# Patient Record
Sex: FEMALE | Race: OTHER | URBAN - METROPOLITAN AREA
[De-identification: names, ages, dates, MRNs, and addresses within clinical notes are randomized per-mention and may not be internally consistent; named-entity substitution may affect disease eponyms.]

---

## 2023-06-26 ENCOUNTER — INITIAL PRENATAL (OUTPATIENT)
Dept: OBGYN CLINIC | Age: 23
End: 2023-06-26

## 2023-06-26 VITALS — SYSTOLIC BLOOD PRESSURE: 122 MMHG | DIASTOLIC BLOOD PRESSURE: 56 MMHG | WEIGHT: 196 LBS

## 2023-06-26 DIAGNOSIS — Z23 NEED FOR DIPHTHERIA-TETANUS-PERTUSSIS (TDAP) VACCINE: ICD-10-CM

## 2023-06-26 DIAGNOSIS — Z34.93 PRENATAL CARE IN THIRD TRIMESTER: ICD-10-CM

## 2023-06-26 DIAGNOSIS — Z3A.32 32 WEEKS GESTATION OF PREGNANCY: ICD-10-CM

## 2023-06-26 DIAGNOSIS — Z34.93 PRENATAL CARE IN THIRD TRIMESTER: Primary | ICD-10-CM

## 2023-06-26 LAB
GLUCOSE, 1HR PP: 122 MG/DL (ref 60–140)
HCT VFR BLD AUTO: 31 % (ref 37–47)
HGB BLD-MCNC: 10 G/DL (ref 12–16)

## 2023-06-26 PROCEDURE — 99203 OFFICE O/P NEW LOW 30 MIN: CPT | Performed by: OBSTETRICS & GYNECOLOGY

## 2023-06-26 PROCEDURE — 90715 TDAP VACCINE 7 YRS/> IM: CPT | Performed by: OBSTETRICS & GYNECOLOGY

## 2023-06-26 PROCEDURE — 90471 IMMUNIZATION ADMIN: CPT | Performed by: OBSTETRICS & GYNECOLOGY

## 2023-06-27 LAB — RPR SER QL: NORMAL

## 2023-07-11 ENCOUNTER — ROUTINE PRENATAL (OUTPATIENT)
Dept: OBGYN CLINIC | Age: 23
End: 2023-07-11

## 2023-07-11 VITALS — WEIGHT: 201 LBS | DIASTOLIC BLOOD PRESSURE: 56 MMHG | SYSTOLIC BLOOD PRESSURE: 114 MMHG

## 2023-07-11 DIAGNOSIS — Z3A.33 33 WEEKS GESTATION OF PREGNANCY: ICD-10-CM

## 2023-07-11 DIAGNOSIS — Z34.93 PRENATAL CARE IN THIRD TRIMESTER: Primary | ICD-10-CM

## 2023-07-11 PROCEDURE — 99213 OFFICE O/P EST LOW 20 MIN: CPT | Performed by: OBSTETRICS & GYNECOLOGY

## 2023-07-11 NOTE — PROGRESS NOTES
Patient's last menstrual period was 11/20/2022.   Please reference prenatal and OB flow chart for further information  PT here today for routine prenatal care  Pt endorses fetal movement and denies loss of fluid, contractions or vaginal bleeding  Pt without complaints  ROS:  Pt denies headache, dysuria, nausea/vomiting  PE:  BP (!) 114/56   Wt 201 lb (91.2 kg)   LMP 11/20/2022   Gen - Alert and oriented x 3  HEENT- NC/AT, CVS - RRR, Lungs - CTAB  Abd - FH 34  Appropriate fetal growth  1hr 122, hgb 10  US pending Referred To Mid-Level For Closure Text (Leave Blank If You Do Not Want): After obtaining clear surgical margins the patient was sent to a mid-level provider for surgical repair.  The patient understands they will receive post-surgical care and follow-up from the mid-level provider.

## 2023-07-20 ENCOUNTER — HOSPITAL ENCOUNTER (OUTPATIENT)
Dept: ULTRASOUND IMAGING | Age: 23
Discharge: HOME | End: 2023-07-22
Attending: OBSTETRICS & GYNECOLOGY

## 2023-07-20 DIAGNOSIS — Z34.93 PRENATAL CARE IN THIRD TRIMESTER: ICD-10-CM

## 2023-07-20 DIAGNOSIS — Z3A.32 32 WEEKS GESTATION OF PREGNANCY: ICD-10-CM

## 2023-07-20 PROCEDURE — 76815 OB US LIMITED FETUS(S): CPT

## 2023-07-25 ENCOUNTER — ROUTINE PRENATAL (OUTPATIENT)
Dept: OBGYN CLINIC | Age: 23
End: 2023-07-25

## 2023-07-25 VITALS — SYSTOLIC BLOOD PRESSURE: 118 MMHG | DIASTOLIC BLOOD PRESSURE: 64 MMHG | WEIGHT: 203 LBS

## 2023-07-25 DIAGNOSIS — O09.33 INSUFFICIENT PRENATAL CARE IN THIRD TRIMESTER: ICD-10-CM

## 2023-07-25 DIAGNOSIS — Z3A.35 35 WEEKS GESTATION OF PREGNANCY: ICD-10-CM

## 2023-07-25 DIAGNOSIS — Z34.93 PRENATAL CARE IN THIRD TRIMESTER: Primary | ICD-10-CM

## 2023-07-25 PROCEDURE — 99214 OFFICE O/P EST MOD 30 MIN: CPT | Performed by: OBSTETRICS & GYNECOLOGY

## 2023-07-25 RX ORDER — ONDANSETRON 4 MG/1
4 TABLET, FILM COATED ORAL EVERY 8 HOURS PRN
Qty: 30 TABLET | Refills: 3 | Status: SHIPPED | OUTPATIENT
Start: 2023-07-25

## 2023-07-25 NOTE — PROGRESS NOTES
Patient's last menstrual period was 11/20/2022.   Please reference prenatal and OB flow chart for further information  PT here today for routine prenatal care  Pt endorses fetal movement and denies loss of fluid, contractions or vaginal bleeding  Pt without complaints  ROS:  Pt denies headache, dysuria, nausea/vomiting  PE:  /64   Wt 203 lb (92.1 kg)   LMP 11/20/2022   Gen - Alert and oriented x 3  HEENT- NC/AT, CVS - RRR, Lungs - CTAB  Abd - FH 36  Appropriate fetal growth  US reviewed - ceph, FROYLAN 13, EFW 30%  GBS at next apt

## 2023-08-08 ENCOUNTER — ROUTINE PRENATAL (OUTPATIENT)
Dept: OBGYN CLINIC | Age: 23
End: 2023-08-08

## 2023-08-08 VITALS — SYSTOLIC BLOOD PRESSURE: 104 MMHG | DIASTOLIC BLOOD PRESSURE: 58 MMHG | WEIGHT: 209 LBS

## 2023-08-08 DIAGNOSIS — Z3A.37 37 WEEKS GESTATION OF PREGNANCY: ICD-10-CM

## 2023-08-08 DIAGNOSIS — Z34.93 PRENATAL CARE IN THIRD TRIMESTER: ICD-10-CM

## 2023-08-08 DIAGNOSIS — Z34.93 PRENATAL CARE IN THIRD TRIMESTER: Primary | ICD-10-CM

## 2023-08-08 PROCEDURE — 99212 OFFICE O/P EST SF 10 MIN: CPT | Performed by: OBSTETRICS & GYNECOLOGY

## 2023-08-08 NOTE — PROGRESS NOTES
Patient's last menstrual period was 11/20/2022.   Please reference prenatal and OB flow chart for further information  PT here today for routine prenatal care  Pt endorses fetal movement and denies loss of fluid, contractions or vaginal bleeding  Pt without complaints  ROS:  Pt denies headache, dysuria, nausea/vomiting  PE:  BP (!) 104/58   Wt 209 lb (94.8 kg)   LMP 11/20/2022   Gen - Alert and oriented x 3  HEENT- NC/AT, CVS - RRR, Lungs - CTAB  Abd - FH 38  Appropriate fetal growth  GBS pending

## 2023-08-14 ENCOUNTER — ROUTINE PRENATAL (OUTPATIENT)
Dept: OBGYN CLINIC | Age: 23
End: 2023-08-14

## 2023-08-14 VITALS — SYSTOLIC BLOOD PRESSURE: 116 MMHG | DIASTOLIC BLOOD PRESSURE: 70 MMHG | WEIGHT: 208 LBS

## 2023-08-14 DIAGNOSIS — Z3A.38 38 WEEKS GESTATION OF PREGNANCY: ICD-10-CM

## 2023-08-14 DIAGNOSIS — Z34.93 PRENATAL CARE IN THIRD TRIMESTER: Primary | ICD-10-CM

## 2023-08-14 LAB
GP B STREP SPEC QL CULT: ABNORMAL
GP B STREP SPEC QL CULT: ABNORMAL
ORGANISM: ABNORMAL

## 2023-08-14 PROCEDURE — 99212 OFFICE O/P EST SF 10 MIN: CPT | Performed by: OBSTETRICS & GYNECOLOGY

## 2023-08-14 NOTE — PROGRESS NOTES
Patient's last menstrual period was 11/20/2022.   Please reference prenatal and OB flow chart for further information  PT here today for routine prenatal care  Pt endorses fetal movement and denies loss of fluid, contractions or vaginal bleeding  Pt without complaints  ROS:  Pt denies headache, dysuria, nausea/vomiting  PE:  /70   Wt 208 lb (94.3 kg)   LMP 11/20/2022   Gen - Alert and oriented x 3  HEENT- NC/AT, CVS - RRR, Lungs - CTAB  Abd - FH 38  Appropriate fetal growth  GBS positive  Cx: 1/50/-2

## 2023-08-26 ENCOUNTER — ANESTHESIA (OUTPATIENT)
Dept: LABOR AND DELIVERY | Age: 23
End: 2023-08-26

## 2023-08-26 ENCOUNTER — ANESTHESIA EVENT (OUTPATIENT)
Dept: LABOR AND DELIVERY | Age: 23
End: 2023-08-26

## 2023-08-26 ENCOUNTER — HOSPITAL ENCOUNTER (INPATIENT)
Age: 23
LOS: 2 days | Discharge: HOME | End: 2023-08-28
Attending: OBSTETRICS & GYNECOLOGY | Admitting: OBSTETRICS & GYNECOLOGY
Payer: MEDICAID

## 2023-08-26 LAB
ABO + RH BLD: NORMAL
ALBUMIN SERPL-MCNC: 3.3 G/DL (ref 3.5–4.6)
ALP SERPL-CCNC: 254 U/L (ref 40–130)
ALT SERPL-CCNC: 9 U/L (ref 0–33)
AMPHET UR QL SCN: NORMAL
ANION GAP SERPL CALCULATED.3IONS-SCNC: 13 MEQ/L (ref 9–15)
AST SERPL-CCNC: 16 U/L (ref 0–35)
BACTERIA URNS QL MICRO: ABNORMAL /HPF
BARBITURATES UR QL SCN: NORMAL
BASOPHILS # BLD: 0.1 K/UL (ref 0–0.2)
BASOPHILS NFR BLD: 0.7 %
BENZODIAZ UR QL SCN: NORMAL
BILIRUB SERPL-MCNC: <0.2 MG/DL (ref 0.2–0.7)
BILIRUB UR QL STRIP: NEGATIVE
BLD GP AB SCN SERPL QL: NORMAL
BUN SERPL-MCNC: 7 MG/DL (ref 6–20)
CALCIUM SERPL-MCNC: 8.2 MG/DL (ref 8.5–9.9)
CANNABINOIDS UR QL SCN: NORMAL
CHLORIDE SERPL-SCNC: 106 MEQ/L (ref 95–107)
CLARITY UR: ABNORMAL
CO2 SERPL-SCNC: 18 MEQ/L (ref 20–31)
COCAINE UR QL SCN: NORMAL
COLOR UR: YELLOW
CREAT SERPL-MCNC: 0.42 MG/DL (ref 0.5–0.9)
DRUG SCREEN COMMENT UR-IMP: NORMAL
EOSINOPHIL # BLD: 0.1 K/UL (ref 0–0.7)
EOSINOPHIL NFR BLD: 0.5 %
EPI CELLS #/AREA URNS AUTO: ABNORMAL /HPF (ref 0–5)
ERYTHROCYTE [DISTWIDTH] IN BLOOD BY AUTOMATED COUNT: 16.3 % (ref 11.5–14.5)
FENTANYL SCREEN, URINE: NORMAL
GLOBULIN SER CALC-MCNC: 3 G/DL (ref 2.3–3.5)
GLUCOSE SERPL-MCNC: 128 MG/DL (ref 70–99)
GLUCOSE UR STRIP-MCNC: NEGATIVE MG/DL
HBV SURFACE AG SERPL QL IA: NORMAL
HCT VFR BLD AUTO: 29.4 % (ref 37–47)
HGB BLD-MCNC: 9.5 G/DL (ref 12–16)
HGB UR QL STRIP: ABNORMAL
HYALINE CASTS #/AREA URNS AUTO: ABNORMAL /HPF (ref 0–5)
KETONES UR STRIP-MCNC: >=80 MG/DL
LEUKOCYTE ESTERASE UR QL STRIP: ABNORMAL
LYMPHOCYTES # BLD: 2.1 K/UL (ref 1–4.8)
LYMPHOCYTES NFR BLD: 13.2 %
MCH RBC QN AUTO: 24.5 PG (ref 27–31.3)
MCHC RBC AUTO-ENTMCNC: 32.3 % (ref 33–37)
MCV RBC AUTO: 75.7 FL (ref 79.4–94.8)
METHADONE UR QL SCN: NORMAL
MONOCYTES # BLD: 1 K/UL (ref 0.2–0.8)
MONOCYTES NFR BLD: 6.3 %
NEUTROPHILS # BLD: 12.4 K/UL (ref 1.4–6.5)
NEUTS SEG NFR BLD: 79.3 %
NITRITE UR QL STRIP: NEGATIVE
OPIATES UR QL SCN: NORMAL
OXYCODONE UR QL SCN: NORMAL
PCP UR QL SCN: NORMAL
PH UR STRIP: 5.5 [PH] (ref 5–9)
PLATELET # BLD AUTO: 320 K/UL (ref 130–400)
POTASSIUM SERPL-SCNC: 3.3 MEQ/L (ref 3.4–4.9)
PROPOXYPH UR QL SCN: NORMAL
PROT SERPL-MCNC: 6.3 G/DL (ref 6.3–8)
PROT UR STRIP-MCNC: ABNORMAL MG/DL
RBC # BLD AUTO: 3.89 M/UL (ref 4.2–5.4)
RBC #/AREA URNS AUTO: ABNORMAL /HPF (ref 0–5)
SODIUM SERPL-SCNC: 137 MEQ/L (ref 135–144)
SP GR UR STRIP: 1.03 (ref 1–1.03)
UROBILINOGEN UR STRIP-ACNC: 0.2 E.U./DL
WBC # BLD AUTO: 15.7 K/UL (ref 4.8–10.8)
WBC #/AREA URNS AUTO: >100 /HPF (ref 0–5)

## 2023-08-26 PROCEDURE — 86901 BLOOD TYPING SEROLOGIC RH(D): CPT

## 2023-08-26 PROCEDURE — 2580000003 HC RX 258: Performed by: OBSTETRICS & GYNECOLOGY

## 2023-08-26 PROCEDURE — 6360000002 HC RX W HCPCS: Performed by: OBSTETRICS & GYNECOLOGY

## 2023-08-26 PROCEDURE — 6370000000 HC RX 637 (ALT 250 FOR IP): Performed by: OBSTETRICS & GYNECOLOGY

## 2023-08-26 PROCEDURE — 80053 COMPREHEN METABOLIC PANEL: CPT

## 2023-08-26 PROCEDURE — 85025 COMPLETE CBC W/AUTO DIFF WBC: CPT

## 2023-08-26 PROCEDURE — 80307 DRUG TEST PRSMV CHEM ANLYZR: CPT

## 2023-08-26 PROCEDURE — 86592 SYPHILIS TEST NON-TREP QUAL: CPT

## 2023-08-26 PROCEDURE — 2500000003 HC RX 250 WO HCPCS: Performed by: OBSTETRICS & GYNECOLOGY

## 2023-08-26 PROCEDURE — 3700000025 EPIDURAL BLOCK: Performed by: NURSE ANESTHETIST, CERTIFIED REGISTERED

## 2023-08-26 PROCEDURE — 81001 URINALYSIS AUTO W/SCOPE: CPT

## 2023-08-26 PROCEDURE — 86850 RBC ANTIBODY SCREEN: CPT

## 2023-08-26 PROCEDURE — 86900 BLOOD TYPING SEROLOGIC ABO: CPT

## 2023-08-26 PROCEDURE — 2500000003 HC RX 250 WO HCPCS: Performed by: NURSE ANESTHETIST, CERTIFIED REGISTERED

## 2023-08-26 PROCEDURE — 87340 HEPATITIS B SURFACE AG IA: CPT

## 2023-08-26 PROCEDURE — 87389 HIV-1 AG W/HIV-1&-2 AB AG IA: CPT

## 2023-08-26 PROCEDURE — 1220000000 HC SEMI PRIVATE OB R&B

## 2023-08-26 RX ORDER — MISOPROSTOL 200 UG/1
400 TABLET ORAL PRN
Status: DISCONTINUED | OUTPATIENT
Start: 2023-08-26 | End: 2023-08-27

## 2023-08-26 RX ORDER — SODIUM CHLORIDE 0.9 % (FLUSH) 0.9 %
5-40 SYRINGE (ML) INJECTION PRN
Status: DISCONTINUED | OUTPATIENT
Start: 2023-08-26 | End: 2023-08-27

## 2023-08-26 RX ORDER — SODIUM CHLORIDE 9 MG/ML
INJECTION, SOLUTION INTRAVENOUS PRN
Status: DISCONTINUED | OUTPATIENT
Start: 2023-08-26 | End: 2023-08-27

## 2023-08-26 RX ORDER — ACETAMINOPHEN 325 MG/1
650 TABLET ORAL EVERY 4 HOURS PRN
Status: DISCONTINUED | OUTPATIENT
Start: 2023-08-26 | End: 2023-08-27

## 2023-08-26 RX ORDER — SODIUM CHLORIDE, SODIUM LACTATE, POTASSIUM CHLORIDE, AND CALCIUM CHLORIDE .6; .31; .03; .02 G/100ML; G/100ML; G/100ML; G/100ML
500 INJECTION, SOLUTION INTRAVENOUS PRN
Status: DISCONTINUED | OUTPATIENT
Start: 2023-08-26 | End: 2023-08-27

## 2023-08-26 RX ORDER — METHYLERGONOVINE MALEATE 0.2 MG/ML
200 INJECTION INTRAVENOUS PRN
Status: DISCONTINUED | OUTPATIENT
Start: 2023-08-26 | End: 2023-08-27

## 2023-08-26 RX ORDER — NALOXONE HYDROCHLORIDE 0.4 MG/ML
INJECTION, SOLUTION INTRAMUSCULAR; INTRAVENOUS; SUBCUTANEOUS PRN
Status: DISCONTINUED | OUTPATIENT
Start: 2023-08-26 | End: 2023-08-27

## 2023-08-26 RX ORDER — SODIUM CHLORIDE 0.9 % (FLUSH) 0.9 %
5-40 SYRINGE (ML) INJECTION EVERY 12 HOURS SCHEDULED
Status: DISCONTINUED | OUTPATIENT
Start: 2023-08-26 | End: 2023-08-27

## 2023-08-26 RX ORDER — CARBOPROST TROMETHAMINE 250 UG/ML
250 INJECTION, SOLUTION INTRAMUSCULAR PRN
Status: DISCONTINUED | OUTPATIENT
Start: 2023-08-26 | End: 2023-08-27

## 2023-08-26 RX ORDER — NALBUPHINE HYDROCHLORIDE 20 MG/ML
5 INJECTION, SOLUTION INTRAMUSCULAR; INTRAVENOUS; SUBCUTANEOUS
Status: DISCONTINUED | OUTPATIENT
Start: 2023-08-26 | End: 2023-08-27

## 2023-08-26 RX ORDER — DIPHENHYDRAMINE HCL 25 MG
25 TABLET ORAL EVERY 4 HOURS PRN
Status: DISCONTINUED | OUTPATIENT
Start: 2023-08-26 | End: 2023-08-27

## 2023-08-26 RX ORDER — ONDANSETRON 2 MG/ML
4 INJECTION INTRAMUSCULAR; INTRAVENOUS EVERY 6 HOURS PRN
Status: DISCONTINUED | OUTPATIENT
Start: 2023-08-26 | End: 2023-08-27

## 2023-08-26 RX ORDER — DOCUSATE SODIUM 100 MG/1
100 CAPSULE, LIQUID FILLED ORAL 2 TIMES DAILY PRN
Status: DISCONTINUED | OUTPATIENT
Start: 2023-08-26 | End: 2023-08-27

## 2023-08-26 RX ORDER — SODIUM CHLORIDE, SODIUM LACTATE, POTASSIUM CHLORIDE, CALCIUM CHLORIDE 600; 310; 30; 20 MG/100ML; MG/100ML; MG/100ML; MG/100ML
INJECTION, SOLUTION INTRAVENOUS CONTINUOUS
Status: DISCONTINUED | OUTPATIENT
Start: 2023-08-26 | End: 2023-08-27

## 2023-08-26 RX ORDER — SODIUM CHLORIDE, SODIUM LACTATE, POTASSIUM CHLORIDE, AND CALCIUM CHLORIDE .6; .31; .03; .02 G/100ML; G/100ML; G/100ML; G/100ML
1000 INJECTION, SOLUTION INTRAVENOUS PRN
Status: DISCONTINUED | OUTPATIENT
Start: 2023-08-26 | End: 2023-08-27

## 2023-08-26 RX ADMIN — SODIUM CHLORIDE, POTASSIUM CHLORIDE, SODIUM LACTATE AND CALCIUM CHLORIDE 1000 ML: 600; 310; 30; 20 INJECTION, SOLUTION INTRAVENOUS at 18:26

## 2023-08-26 RX ADMIN — Medication 5 ML: at 19:17

## 2023-08-26 RX ADMIN — SODIUM CHLORIDE, POTASSIUM CHLORIDE, SODIUM LACTATE AND CALCIUM CHLORIDE: 600; 310; 30; 20 INJECTION, SOLUTION INTRAVENOUS at 17:26

## 2023-08-26 RX ADMIN — Medication 5 ML: at 19:07

## 2023-08-26 RX ADMIN — Medication 10 ML/HR: at 19:18

## 2023-08-26 RX ADMIN — DIPHENHYDRAMINE HCL 25 MG: 25 TABLET ORAL at 20:33

## 2023-08-26 RX ADMIN — VANCOMYCIN HYDROCHLORIDE 2000 MG: 1 INJECTION, POWDER, LYOPHILIZED, FOR SOLUTION INTRAVENOUS at 19:34

## 2023-08-26 RX ADMIN — SODIUM CHLORIDE, POTASSIUM CHLORIDE, SODIUM LACTATE AND CALCIUM CHLORIDE: 600; 310; 30; 20 INJECTION, SOLUTION INTRAVENOUS at 19:31

## 2023-08-26 NOTE — ANESTHESIA PRE PROCEDURE
Encounters:   08/14/23 208 lb (94.3 kg)   08/08/23 209 lb (94.8 kg)   07/25/23 203 lb (92.1 kg)     There is no height or weight on file to calculate BMI.    CBC:   Lab Results   Component Value Date/Time    HGB 10.0 06/26/2023 12:20 PM    HCT 31.0 06/26/2023 12:20 PM       CMP: No results found for: NA, K, CL, CO2, BUN, CREATININE, GFRAA, AGRATIO, LABGLOM, GLUCOSE, GLU, PROT, CALCIUM, BILITOT, ALKPHOS, AST, ALT    POC Tests: No results for input(s): POCGLU, POCNA, POCK, POCCL, POCBUN, POCHEMO, POCHCT in the last 72 hours. Coags: No results found for: PROTIME, INR, APTT    HCG (If Applicable): No results found for: PREGTESTUR, PREGSERUM, HCG, HCGQUANT     ABGs: No results found for: PHART, PO2ART, NRX6CMD, KTD8WZO, BEART, T0TLZKEP     Type & Screen (If Applicable):  No results found for: LABABO, LABRH    Drug/Infectious Status (If Applicable):  No results found for: HIV, HEPCAB    COVID-19 Screening (If Applicable): No results found for: COVID19        Anesthesia Evaluation  Patient summary reviewed and Nursing notes reviewed no history of anesthetic complications:   Airway: Mallampati: III  TM distance: >3 FB   Neck ROM: full  Mouth opening: > = 3 FB   Dental: normal exam         Pulmonary:normal exam                               Cardiovascular:Negative CV ROS  Exercise tolerance: no interval change,           Rhythm: regular  Rate: normal           Beta Blocker:  Not on Beta Blocker         Neuro/Psych:   Negative Neuro/Psych ROS              GI/Hepatic/Renal: Neg GI/Hepatic/Renal ROS            Endo/Other:                      ROS comment: G1 Abdominal: normal exam            Vascular: negative vascular ROS. Other Findings:           Anesthesia Plan      epidural     ASA 2             Anesthetic plan and risks discussed with patient. Use of blood products discussed with patient whom. Plan discussed with CRNA and attending.                     LOPZE Jean - JAMAL   8/26/2023

## 2023-08-26 NOTE — FLOWSHEET NOTE
Call to Dr. Carlton Buckner for patient SVE and contractions. Left voicemail.   Electronically signed by Gina Huddleston RN on 8/26/2023 at 4:08 PM

## 2023-08-26 NOTE — FLOWSHEET NOTE
18- Patient assisted to sitting position   1843- CRNA in room   1849-Time out  1900-Test dose  1907-Bolus dose   1909-Patient laying down

## 2023-08-26 NOTE — ANESTHESIA PROCEDURE NOTES
Epidural Block    Patient location during procedure: OB  Start time: 8/26/2023 6:50 PM  End time: 8/26/2023 6:59 PM  Reason for block: labor epidural  Staffing  Performed: resident/CRNA   Resident/CRNA: LOPEZ Warren CRNA  Epidural  Patient position: sitting  Prep: Betadine and site prepped and draped  Patient monitoring: frequent blood pressure checks, continuous pulse ox and cardiac monitor  Approach: midline  Location: L3-4  Injection technique: ORESTES air  Provider prep: mask and sterile gloves  Needle  Needle type: Tuohy   Needle gauge: 17 G  Needle length: 3.5 in  Needle insertion depth: 7.5 cm  Catheter type: side hole  Catheter size: 20 G  Catheter at skin depth: 14 cm  Test dose: negative  Kit: preifix CEA TRAY  Lot number: [id number]  Expiration date: 11/30/2024Catheter Secured: tegaderm and tape  Assessment  Sensory level: T8  Hemodynamics: stable  Attempts: 2  Outcomes: uncomplicated and patient tolerated procedure well  Additional Notes  FHR stable, Pt VSS, Pt comfortable  Preanesthetic Checklist  Completed: patient identified, IV checked, site marked, risks and benefits discussed, surgical/procedural consents, equipment checked, pre-op evaluation, timeout performed, anesthesia consent given, oxygen available, monitors applied/VS acknowledged, fire risk safety assessment completed and verbalized and blood product R/B/A discussed and consented

## 2023-08-27 PROBLEM — Z34.90 TERM PREGNANCY: Status: ACTIVE | Noted: 2023-08-27

## 2023-08-27 LAB
HIV AG/AB: NONREACTIVE
RPR SER QL: NORMAL

## 2023-08-27 PROCEDURE — 2500000003 HC RX 250 WO HCPCS: Performed by: OBSTETRICS & GYNECOLOGY

## 2023-08-27 PROCEDURE — 1220000000 HC SEMI PRIVATE OB R&B

## 2023-08-27 PROCEDURE — 2580000003 HC RX 258: Performed by: OBSTETRICS & GYNECOLOGY

## 2023-08-27 PROCEDURE — 10907ZC DRAINAGE OF AMNIOTIC FLUID, THERAPEUTIC FROM PRODUCTS OF CONCEPTION, VIA NATURAL OR ARTIFICIAL OPENING: ICD-10-PCS | Performed by: OBSTETRICS & GYNECOLOGY

## 2023-08-27 PROCEDURE — 6360000002 HC RX W HCPCS: Performed by: OBSTETRICS & GYNECOLOGY

## 2023-08-27 PROCEDURE — 3700000025 EPIDURAL BLOCK: Performed by: NURSE ANESTHETIST, CERTIFIED REGISTERED

## 2023-08-27 PROCEDURE — 0KQM0ZZ REPAIR PERINEUM MUSCLE, OPEN APPROACH: ICD-10-PCS | Performed by: OBSTETRICS & GYNECOLOGY

## 2023-08-27 PROCEDURE — 6370000000 HC RX 637 (ALT 250 FOR IP): Performed by: OBSTETRICS & GYNECOLOGY

## 2023-08-27 PROCEDURE — 6360000002 HC RX W HCPCS: Performed by: NURSE ANESTHETIST, CERTIFIED REGISTERED

## 2023-08-27 PROCEDURE — 2500000003 HC RX 250 WO HCPCS: Performed by: NURSE ANESTHETIST, CERTIFIED REGISTERED

## 2023-08-27 RX ORDER — DOCUSATE SODIUM 100 MG/1
100 CAPSULE, LIQUID FILLED ORAL DAILY
Status: DISCONTINUED | OUTPATIENT
Start: 2023-08-27 | End: 2023-08-28 | Stop reason: HOSPADM

## 2023-08-27 RX ORDER — LIDOCAINE HCL/EPINEPHRINE/PF 2%-1:200K
VIAL (ML) INJECTION PRN
Status: DISCONTINUED | OUTPATIENT
Start: 2023-08-27 | End: 2023-08-27 | Stop reason: SDUPTHER

## 2023-08-27 RX ORDER — ONDANSETRON 2 MG/ML
4 INJECTION INTRAMUSCULAR; INTRAVENOUS EVERY 6 HOURS PRN
Status: DISCONTINUED | OUTPATIENT
Start: 2023-08-27 | End: 2023-08-28 | Stop reason: HOSPADM

## 2023-08-27 RX ORDER — SODIUM CHLORIDE 9 MG/ML
INJECTION, SOLUTION INTRAVENOUS PRN
Status: DISCONTINUED | OUTPATIENT
Start: 2023-08-27 | End: 2023-08-28 | Stop reason: HOSPADM

## 2023-08-27 RX ORDER — FENTANYL CITRATE 50 UG/ML
INJECTION, SOLUTION INTRAMUSCULAR; INTRAVENOUS PRN
Status: DISCONTINUED | OUTPATIENT
Start: 2023-08-27 | End: 2023-08-27 | Stop reason: SDUPTHER

## 2023-08-27 RX ORDER — BUPIVACAINE HYDROCHLORIDE 2.5 MG/ML
INJECTION, SOLUTION EPIDURAL; INFILTRATION; INTRACAUDAL PRN
Status: DISCONTINUED | OUTPATIENT
Start: 2023-08-27 | End: 2023-08-27 | Stop reason: SDUPTHER

## 2023-08-27 RX ORDER — SODIUM CHLORIDE 0.9 % (FLUSH) 0.9 %
5-40 SYRINGE (ML) INJECTION EVERY 12 HOURS SCHEDULED
Status: DISCONTINUED | OUTPATIENT
Start: 2023-08-27 | End: 2023-08-28 | Stop reason: HOSPADM

## 2023-08-27 RX ORDER — SODIUM CHLORIDE 0.9 % (FLUSH) 0.9 %
5-40 SYRINGE (ML) INJECTION PRN
Status: DISCONTINUED | OUTPATIENT
Start: 2023-08-27 | End: 2023-08-28 | Stop reason: HOSPADM

## 2023-08-27 RX ORDER — MODIFIED LANOLIN
OINTMENT (GRAM) TOPICAL PRN
Status: DISCONTINUED | OUTPATIENT
Start: 2023-08-27 | End: 2023-08-28 | Stop reason: HOSPADM

## 2023-08-27 RX ORDER — IBUPROFEN 600 MG/1
600 TABLET ORAL EVERY 6 HOURS PRN
Qty: 120 TABLET | Refills: 3 | Status: SHIPPED | OUTPATIENT
Start: 2023-08-27 | End: 2023-08-28 | Stop reason: HOSPADM

## 2023-08-27 RX ORDER — ACETAMINOPHEN 500 MG
1000 TABLET ORAL EVERY 8 HOURS PRN
Status: DISCONTINUED | OUTPATIENT
Start: 2023-08-27 | End: 2023-08-28 | Stop reason: HOSPADM

## 2023-08-27 RX ORDER — IBUPROFEN 600 MG/1
600 TABLET ORAL EVERY 8 HOURS PRN
Status: DISCONTINUED | OUTPATIENT
Start: 2023-08-27 | End: 2023-08-28 | Stop reason: HOSPADM

## 2023-08-27 RX ORDER — BISACODYL 10 MG
10 SUPPOSITORY, RECTAL RECTAL DAILY PRN
Status: DISCONTINUED | OUTPATIENT
Start: 2023-08-27 | End: 2023-08-28 | Stop reason: HOSPADM

## 2023-08-27 RX ADMIN — BENZOCAINE AND LEVOMENTHOL: 200; 5 SPRAY TOPICAL at 17:34

## 2023-08-27 RX ADMIN — BUPIVACAINE HYDROCHLORIDE 5 ML: 2.5 INJECTION, SOLUTION EPIDURAL; INFILTRATION; INTRACAUDAL; PERINEURAL at 11:26

## 2023-08-27 RX ADMIN — DOCUSATE SODIUM 100 MG: 100 CAPSULE, LIQUID FILLED ORAL at 17:34

## 2023-08-27 RX ADMIN — IBUPROFEN 600 MG: 600 TABLET, FILM COATED ORAL at 17:34

## 2023-08-27 RX ADMIN — Medication 10 ML/HR: at 05:27

## 2023-08-27 RX ADMIN — FENTANYL CITRATE 100 MCG: 50 INJECTION, SOLUTION INTRAMUSCULAR; INTRAVENOUS at 09:16

## 2023-08-27 RX ADMIN — LIDOCAINE HYDROCHLORIDE,EPINEPHRINE BITARTRATE 5 ML: 20; .005 INJECTION, SOLUTION EPIDURAL; INFILTRATION; INTRACAUDAL; PERINEURAL at 09:16

## 2023-08-27 RX ADMIN — ACETAMINOPHEN 650 MG: 325 TABLET ORAL at 08:31

## 2023-08-27 RX ADMIN — Medication 1 MILLI-UNITS/MIN: at 12:14

## 2023-08-27 RX ADMIN — VANCOMYCIN HYDROCHLORIDE 1000 MG: 1 INJECTION, POWDER, LYOPHILIZED, FOR SOLUTION INTRAVENOUS at 07:22

## 2023-08-27 RX ADMIN — SODIUM CHLORIDE, POTASSIUM CHLORIDE, SODIUM LACTATE AND CALCIUM CHLORIDE: 600; 310; 30; 20 INJECTION, SOLUTION INTRAVENOUS at 04:07

## 2023-08-27 RX ADMIN — Medication 87.3 MILLI-UNITS/MIN: at 15:04

## 2023-08-27 ASSESSMENT — PAIN DESCRIPTION - LOCATION
LOCATION: ABDOMEN
LOCATION: ABDOMEN

## 2023-08-27 ASSESSMENT — PAIN DESCRIPTION - DESCRIPTORS
DESCRIPTORS: CRAMPING
DESCRIPTORS: CRAMPING

## 2023-08-27 ASSESSMENT — PAIN SCALES - GENERAL
PAINLEVEL_OUTOF10: 6
PAINLEVEL_OUTOF10: 3

## 2023-08-27 NOTE — PLAN OF CARE
Problem: Pain  Goal: Verbalizes/displays adequate comfort level or baseline comfort level  2023 180 by Pablo Hi RN  Outcome: Progressing       Problem: Infection - Adult  Goal: Absence of infection at discharge  2023 180 by Pablo Hi RN  Outcome: Progressing    Problem: Safety - Adult  Goal: Free from fall injury  2023 by Pablo Hi RN  Outcome: Progressing    Problem: Discharge Planning  Goal: Discharge to home or other facility with appropriate resources  2023 by Pablo Hi RN  Outcome: Progressing       Problem: Postpartum  Goal: Experiences normal postpartum course  Description:  Postpartum OB-Pregnancy care plan goal which identifies if the mother is experiencing a normal postpartum course  Outcome: Progressing  Goal: Appropriate maternal -  bonding  Description:  Postpartum OB-Pregnancy care plan goal which identifies if the mother and  are bonding appropriately  Outcome: Progressing  Goal: Establishment of infant feeding pattern  Description:  Postpartum OB-Pregnancy care plan goal which identifies if the mother is establishing a feeding pattern with their   Outcome: Progressing  Goal: Incisions, wounds, or drain sites healing without S/S of infection  Outcome: Progressing

## 2023-08-27 NOTE — L&D DELIVERY SUMMARY NOTE
PROCEDURE NOTE: VAGINAL DELIVERY  24 y.o.  at 37w0d GA who presented to L&D for CTX and early labor. Pt underwent augmentation of labor with pitocin and AROM noting mec fluid. Pt with epidural for pain management. Pt then with rectal pressure and the desire to push. PT was instructed on pushing efforts and the infant's head was delivered atraumatically followed quickly by the rest of the body. The infant with spontaneous cry was then laid on the mother's abdomen and the cord remained intact for 1 minute for delayed cord clamping. The cord was then clamped and cut and the infant was placed on mother's chest for skin to skin care. The cord blood was then drawn for labs and the placenta delivered spontaneously. The vagina and cervix were inspected and a second degree midline laceration was noted and repaired in the usual fashion. The infant, a viable male infant was born at 14:57 with Apgars 8 and 9 and wt pending  Mother was noted to have excellent uterine tone. Sponge and instrument counts were correct x 2 and mother and baby were recovered in room without difficulty.      EBL: 300cc - please see nursing notes/charting for further EBL    Pamela Selby MD

## 2023-08-27 NOTE — PLAN OF CARE
Problem: Vaginal Birth or  Section  Goal: Fetal and maternal status remain reassuring during the birth process  Description:  Birth OB-Pregnancy care plan goal which identifies if the fetal and maternal status remain reassuring during the birth process  Outcome: Progressing     Problem: Pain  Goal: Verbalizes/displays adequate comfort level or baseline comfort level  Outcome: Progressing     Problem: Infection - Adult  Goal: Absence of infection at discharge  Outcome: Progressing  Goal: Absence of infection during hospitalization  Outcome: Progressing  Goal: Absence of fever/infection during anticipated neutropenic period  Outcome: Progressing     Problem: Safety - Adult  Goal: Free from fall injury  Outcome: Progressing     Problem: Discharge Planning  Goal: Discharge to home or other facility with appropriate resources  Outcome: Progressing

## 2023-08-27 NOTE — FLOWSHEET NOTE
Call back received from Dr Jase Zavala and updated on patient. New orders to have in house Dr CASTRO and start pitocin per protocol.

## 2023-08-27 NOTE — H&P
Department of Obstetrics and Gynecology   History & Physical    Pt Name: Kayden Casanova  MRN: [id number] [facility] #: [de-identified]  YOB: 2000  Estimated Date of Delivery: 23      HPI: The patient is a 24 y.o.  37w0d female who presents to [facility] triage for regular painful CTX.   +FM, -VB, -LOF, +CTX    Allergies:     Allergies as of 2023 - Fully Reviewed 2023   Allergen Reaction Noted    Penicillins Itching, Swelling, and Rash 2023       Medications:    Current Facility-Administered Medications:     oxytocin (PITOCIN) 30 units in 500 mL infusion, 1 sera-units/min, IntraVENous, Continuous, Bernard Paul MD, Last Rate: 6 mL/hr at 23 1441, 6 sera-units/min at 23 1441    lactated ringers IV soln infusion, , IntraVENous, Continuous, Bernard Paul MD, Last Rate: 125 mL/hr at 23 0539, Rate Verify at 23 0539    lactated ringers bolus bolus 500 mL, 500 mL, IntraVENous, PRN **OR** lactated ringers bolus bolus 1,000 mL, 1,000 mL, IntraVENous, PRN, Bernard Paul MD, Last Rate: 500 mL/hr at 23 1826, 1,000 mL at 23 1826    sodium chloride flush 0.9 % injection 5-40 mL, 5-40 mL, IntraVENous, 2 times per day, Bernard Paul MD    sodium chloride flush 0.9 % injection 5-40 mL, 5-40 mL, IntraVENous, PRN, Iliana Samano MD    0.9 % sodium chloride infusion, , IntraVENous, PRN, Bernard Paul MD    nalbuphine (NUBAIN) injection 5 mg, 5 mg, IntraMUSCular, Once PRN **AND** nalbuphine (NUBAIN) injection 5 mg, 5 mg, IntraVENous, Once PRN, Bernard Paul MD    ondansetron (ZOFRAN) injection 4 mg, 4 mg, IntraVENous, Q6H PRN, Bernard Paul MD    diphenhydrAMINE (BENADRYL) tablet 25 mg, 25 mg, Oral, Q4H PRN, Bernard Paul MD, 25 mg at 23    oxytocin (PITOCIN) 30 units in 500 mL infusion, 87.3 sera-units/min, IntraVENous, Continuous PRN **AND** oxytocin (PITOCIN) 10 unit bolus from the bag, 10 Units, IntraVENous, PRN, Iliana
Walking

## 2023-08-27 NOTE — FLOWSHEET NOTE
[id number]  session. Explained process of AROM to patient as well as purpose of pitocin. All questions concerns addressed.

## 2023-08-27 NOTE — FLOWSHEET NOTE
Dr Angie Gay at bedside, viewed strip, AROM, thin [facility] SVE 5, 90, -2. FSE placed at this time, patient tolerated well.

## 2023-08-27 NOTE — FLOWSHEET NOTE
65- Dr Woody Olivas at bedside, viewed strip. Prep patient for delivery. 1445- Damico out. 1447- Patient actively pushing. RN remains in continuous attendance at the bedside. Assessment & evaluation of fetal heart rate ongoing via continuous EFM' every 30 minutes.

## 2023-08-27 NOTE — FLOWSHEET NOTE
Patient educated on importance of position changes during labor, patient refuses to reposition at this time, prefers to be on back at this time.

## 2023-08-27 NOTE — FLOWSHEET NOTE
#[id number] [facility] used to explain and help [facility] to [facility] Pt voided 600 ml of bloody urine. No clots. Pt denies any lightheadedness or dizziness but does complain of a slight H/A. 4 on number scale. Rae care and gown change done. Dermaplast explained and used. Pt up to chair with standby assist. Tolerated well. States H/A is gone. No pain a this time. 0 on number scale. Pt Linens changed on bed. No further questions at this time. Call light in reach. FOB by side.

## 2023-08-27 NOTE — FLOWSHEET NOTE
Spoke with Dr Christal Velez and updated on EFM finding as well as most recent SVE and inability to begin pitocin, per protocol. Per Dr Christal Velez, if patient unable to make significant cervical change or unable to start pitocin by 1400, patient may need to have  for failure to progress/fetal intolerance of labor. Patient updated and all questions concerns addressed at this time.

## 2023-08-27 NOTE — PLAN OF CARE
Problem: Vaginal Birth or  Section  Goal: Fetal and maternal status remain reassuring during the birth process  Description:  Birth OB-Pregnancy care plan goal which identifies if the fetal and maternal status remain reassuring during the birth process  2023 by Aung Perez RN  Outcome: Progressing  2023 by Cheryle Nicks, RN  Outcome: Progressing     Problem: Pain  Goal: Verbalizes/displays adequate comfort level or baseline comfort level  2023 by Aung Perez RN  Outcome: Progressing  2023 by Cheryle Nicks, RN  Outcome: Progressing     Problem: Infection - Adult  Goal: Absence of infection at discharge  2023 by Aung Perez RN  Outcome: Progressing  2023 by Cheryle Nicks, RN  Outcome: Progressing  Goal: Absence of infection during hospitalization  2023 by Aung Perez RN  Outcome: Progressing  2023 by Cheryle Nicks, RN  Outcome: Progressing  Goal: Absence of fever/infection during anticipated neutropenic period  2023 by Aung Perez RN  Outcome: Progressing  2023 by Cheryle Nicks, RN  Outcome: Progressing     Problem: Safety - Adult  Goal: Free from fall injury  2023 by Aung Perez RN  Outcome: Progressing  2023 by Cheryle Nicks, RN  Outcome: Progressing     Problem: Discharge Planning  Goal: Discharge to home or other facility with appropriate resources  2023 by Aung Perez RN  Outcome: Progressing  2023 by Cheryle Nicks, RN  Outcome: Progressing

## 2023-08-27 NOTE — FLOWSHEET NOTE
CRNA in room 1111  Patient sitting up 1112  Time out 1114  Test dose 1120  Bolus 1124  Wedged to side 1126

## 2023-08-28 VITALS
BODY MASS INDEX: 39.65 KG/M2 | TEMPERATURE: 98.8 F | OXYGEN SATURATION: 100 % | DIASTOLIC BLOOD PRESSURE: 55 MMHG | HEART RATE: 100 BPM | RESPIRATION RATE: 18 BRPM | HEIGHT: 61 IN | WEIGHT: 210 LBS | SYSTOLIC BLOOD PRESSURE: 106 MMHG

## 2023-08-28 LAB
ERYTHROCYTE [DISTWIDTH] IN BLOOD BY AUTOMATED COUNT: 16.2 % (ref 11.5–14.5)
HCT VFR BLD AUTO: 23.7 % (ref 37–47)
HGB BLD-MCNC: 7.5 G/DL (ref 12–16)
MCH RBC QN AUTO: 23.8 PG (ref 27–31.3)
MCHC RBC AUTO-ENTMCNC: 31.8 % (ref 33–37)
MCV RBC AUTO: 74.8 FL (ref 79.4–94.8)
PLATELET # BLD AUTO: 276 K/UL (ref 130–400)
RBC # BLD AUTO: 3.17 M/UL (ref 4.2–5.4)
WBC # BLD AUTO: 18.7 K/UL (ref 4.8–10.8)

## 2023-08-28 PROCEDURE — 99024 POSTOP FOLLOW-UP VISIT: CPT | Performed by: OBSTETRICS & GYNECOLOGY

## 2023-08-28 PROCEDURE — 85027 COMPLETE CBC AUTOMATED: CPT

## 2023-08-28 PROCEDURE — 7200000001 HC VAGINAL DELIVERY

## 2023-08-28 PROCEDURE — 6360000002 HC RX W HCPCS: Performed by: OBSTETRICS & GYNECOLOGY

## 2023-08-28 PROCEDURE — 2580000003 HC RX 258: Performed by: OBSTETRICS & GYNECOLOGY

## 2023-08-28 RX ORDER — FERROUS SULFATE 325(65) MG
325 TABLET ORAL 2 TIMES DAILY WITH MEALS
Status: DISCONTINUED | OUTPATIENT
Start: 2023-08-28 | End: 2023-08-28 | Stop reason: HOSPADM

## 2023-08-28 RX ORDER — PSEUDOEPHEDRINE HCL 30 MG
100 TABLET ORAL DAILY
Qty: 30 CAPSULE | Refills: 0 | Status: SHIPPED | OUTPATIENT
Start: 2023-08-28 | End: 2023-09-27

## 2023-08-28 RX ORDER — FERROUS SULFATE 325(65) MG
325 TABLET ORAL 2 TIMES DAILY WITH MEALS
Qty: 30 TABLET | Refills: 1 | Status: SHIPPED | OUTPATIENT
Start: 2023-08-28 | End: 2023-09-12

## 2023-08-28 RX ORDER — IBUPROFEN 800 MG/1
800 TABLET ORAL EVERY 8 HOURS PRN
Qty: 30 TABLET | Refills: 0 | Status: SHIPPED | OUTPATIENT
Start: 2023-08-28 | End: 2023-09-07

## 2023-08-28 RX ADMIN — IRON SUCROSE 200 MG: 20 INJECTION, SOLUTION INTRAVENOUS at 09:57

## 2023-08-28 NOTE — DISCHARGE SUMMARY
Discharge Summary    Kim Spaulding  :  2000  MRN:  [id number]    ADMIT DATE:  2023  DISCHARGE DATE:  2023    PRIMARY CARE PHYSICIAN:  No primary care provider on file. VISIT STATUS: Admission    CODE STATUS:  Prior    DISCHARGE DIAGNOSES:  Principal Problem:    Normal delivery at term  Active Problems:    Term pregnancy  Resolved Problems:    * No resolved hospital problems. *      HOSPITAL COURSE:  Patient presented in spontaneous labor at term. She was admitted for  labor management and received Pitocin augmentation with AROM. She received epidural and ultimately delivered of viable male infant 23 @ [id number]. Please refer to delivery notes for further details. She received IV venofer and po iron for anemia. On PPD#1, patient was stable and requesting discharge. She was discharged home with instructions, medications and follow up with Dr. Rachael Mcdaniel in 4-6 weeks.     RECOMMENDED NEXT STEPS:   Discharge home     DISCHARGE MEDICATIONS:         Medication List        START taking these medications      docusate 100 MG Caps  Commonly known as: COLACE, DULCOLAX  Take 100 mg by mouth daily     ferrous sulfate 325 (65 Fe) MG tablet  Commonly known as: IRON 325  Take 1 tablet by mouth 2 times daily (with meals) for 15 days     ibuprofen 800 MG tablet  Commonly known as: ADVIL;MOTRIN  Take 1 tablet by mouth every 8 hours as needed for Pain            CONTINUE taking these medications      PRENATAL 1 PO            ASK your doctor about these medications      ondansetron 4 MG tablet  Commonly known as: Zofran  Take 1 tablet by mouth every 8 hours as needed for Nausea or Vomiting               Where to Get Your Medications        These medications were sent to [facility], [address] [place] [address] Road  [address] [facility], [address] [place] [address] Drive      Phone: [phone]   docusate 100 MG Caps  ferrous sulfate 325 (65 Fe) MG tablet  ibuprofen 800 MG

## 2023-08-28 NOTE — PROGRESS NOTES
CLINICAL PHARMACY NOTE: MEDS TO BEDS    Total # of Prescriptions Filled: 3   The following medications were delivered to the patient:  Ibuprofen 800 mg tab  Iron 325 (65 Fe) mg tab  Docusate 100 mg cap    Additional Documentation:

## 2023-08-28 NOTE — LACTATION NOTE
This note was copied from a baby's chart.  -initial lactation consult  -visit conducted using  #[id number]  -baby in nursery for circ at time of visit  -mom is primip  -reports difficult latch, using nipple shield  -baby has + output  -provided with written breastfeeding education materials (in [facility] [place])  -reviewed normal infant feeding patterns and behaviors  -counseled on feeding cues, benefits of skin to skin  -encouraged to call for next feed  -mom verbalized understanding    1030-follow up  -mom attempting to latch baby to breast upon entering room  - #[id number] utilized for visit  Lots of colostrum easily hand expressible  -baby latches to breast, does not suck  -suck training and oral assessment performed  -baby noted to hold tongue posterior in mouth  -lots of stimulation needed to elicit suck reflex  -baby noted to be gaggy  during oral assessment  -recommend OT and continued use of nipple shield  -mom is agreeable  -showed how to use breast compression and stimulation techniques to encouraged active sucking  -offered support  -frequent skin to skin  -will place OT order  -call for feeds as needed  -mom verbalized understanding    1105-follow up  -parents questioning formula supplementation  - #[id number] used to conduct visit  -reinforced previous teaching re: diaper counts, colostrum, appropriate volumes   -anticipate milk to come in on day #2-5 post partum  -offered reassurance and ultimately support for her choice  -declines offer of formula and/or donor breastmilk supplement at this time  -will await OT eval/tx   -call for assistance as needed

## 2023-08-28 NOTE — FLOWSHEET NOTE
[facility] #[id number]    [phone]    All questions answered, pt verbalized understanding at this time

## 2023-08-28 NOTE — PLAN OF CARE
Problem: Infection - Adult  Goal: Absence of infection at discharge  Outcome: Completed  Goal: Absence of infection during hospitalization  Outcome: Completed  Goal: Absence of fever/infection during anticipated neutropenic period  Outcome: Completed     Problem: Safety - Adult  Goal: Free from fall injury  Outcome: Completed     Problem: Discharge Planning  Goal: Discharge to home or other facility with appropriate resources  Outcome: Completed     Problem: Pain  Goal: Verbalizes/displays adequate comfort level or baseline comfort level  Outcome: Completed

## 2023-08-28 NOTE — PLAN OF CARE
Problem: Vaginal Birth or  Section  Goal: Fetal and maternal status remain reassuring during the birth process  Description:  Birth OB-Pregnancy care plan goal which identifies if the fetal and maternal status remain reassuring during the birth process  2023 180 by Maricarmen Cheng RN  Outcome: Completed     Problem: Pain  Goal: Verbalizes/displays adequate comfort level or baseline comfort level  2023 by Steven Saldaña RN  Outcome: Progressing  2023 1804 by Maricarmen Cheng RN  Outcome: Progressing     Problem: Infection - Adult  Goal: Absence of infection at discharge  2023 by Steven Saldaña RN  Outcome: Progressing  2023 1804 by Maricarmen Cheng RN  Outcome: Progressing  Goal: Absence of infection during hospitalization  Outcome: Progressing  Goal: Absence of fever/infection during anticipated neutropenic period  Outcome: Progressing     Problem: Safety - Adult  Goal: Free from fall injury  2023 by Steven Saldaña RN  Outcome: Progressing  2023 1804 by Maricarmen Cheng RN  Outcome: Progressing     Problem: Discharge Planning  Goal: Discharge to home or other facility with appropriate resources  2023 by Steven Saldaña RN  Outcome: Progressing  2023 1804 by Maricarmen Cheng RN  Outcome: Progressing     Problem: Postpartum  Goal: Experiences normal postpartum course  Description:  Postpartum OB-Pregnancy care plan goal which identifies if the mother is experiencing a normal postpartum course  2023 by Steven Saldaña RN  Outcome: Progressing  2023 1804 by Maricarmen Cheng RN  Outcome: Progressing  Goal: Appropriate maternal -  bonding  Description:  Postpartum OB-Pregnancy care plan goal which identifies if the mother and  are bonding appropriately  2023 by Steven Saldaña RN  Outcome: Progressing  2023 180 by Maricarmen Cheng RN  Outcome: Progressing  Goal: Establishment of infant

## 2023-09-13 ENCOUNTER — TELEPHONE (OUTPATIENT)
Dept: OBGYN CLINIC | Age: 23
End: 2023-09-13

## 2023-10-09 ENCOUNTER — OFFICE VISIT (OUTPATIENT)
Dept: OBGYN CLINIC | Age: 23
End: 2023-10-09
Payer: MEDICAID

## 2023-10-09 VITALS — BODY MASS INDEX: 39.65 KG/M2 | HEIGHT: 61 IN

## 2023-10-09 DIAGNOSIS — Z30.09 BIRTH CONTROL COUNSELING: ICD-10-CM

## 2023-10-09 RX ORDER — ACETAMINOPHEN AND CODEINE PHOSPHATE 120; 12 MG/5ML; MG/5ML
1 SOLUTION ORAL DAILY
Qty: 84 TABLET | Refills: 5 | Status: SHIPPED | OUTPATIENT
Start: 2023-10-09

## 2023-10-10 ENCOUNTER — TELEPHONE (OUTPATIENT)
Dept: OBGYN CLINIC | Age: 23
End: 2023-10-10

## 2023-10-10 NOTE — TELEPHONE ENCOUNTER
Spoke to pt through interpretive services that she wanted her prescription to go to a different [facility] pharmacy. Pt was made aware she can call the [facility] that she wants it to go to and they will pull it up in the system and fill it. If she has any problems she can call the office back.

## 2023-10-19 ENCOUNTER — TELEPHONE (OUTPATIENT)
Dept: OBGYN CLINIC | Age: 23
End: 2023-10-19

## 2023-10-19 NOTE — TELEPHONE ENCOUNTER
LM to call office and schedule referral to establish with primary care or if established need to fill in doctor name on chart

## 2024-12-09 RX ORDER — NORETHINDRONE 0.35 MG/1
1 TABLET ORAL DAILY
Qty: 84 TABLET | Refills: 4 | OUTPATIENT
Start: 2024-12-09